# Patient Record
Sex: MALE | Race: BLACK OR AFRICAN AMERICAN | NOT HISPANIC OR LATINO
[De-identification: names, ages, dates, MRNs, and addresses within clinical notes are randomized per-mention and may not be internally consistent; named-entity substitution may affect disease eponyms.]

---
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
## 2021-05-26 NOTE — ED PROVIDER NOTE - NS ED ROS FT
Wheelchair/Stroller General: Denies fever, chills  HEENT: Denies sensory changes, sore throat  Neck: Denies neck pain, neck stiffness  Resp: Endorses SOB  Cardiovascular: Endorses palpitations. Denies CP, LE edema  GI: Denies nausea, vomiting, abdominal pain, diarrhea, constipation, blood in stool  : Denies dysuria, hematuria, frequency, incontinence  MSK: Denies back pain  Neuro: Denies HA, dizziness, numbness, weakness  Skin: Denies rashes

## 2021-08-02 ENCOUNTER — TRANSCRIPTION ENCOUNTER (OUTPATIENT)
Age: 21
End: 2021-08-02

## 2022-06-15 ENCOUNTER — NON-APPOINTMENT (OUTPATIENT)
Age: 22
End: 2022-06-15

## 2023-06-01 ENCOUNTER — EMERGENCY (EMERGENCY)
Facility: HOSPITAL | Age: 23
LOS: 1 days | Discharge: HOME | End: 2023-06-01
Attending: STUDENT IN AN ORGANIZED HEALTH CARE EDUCATION/TRAINING PROGRAM
Payer: MEDICAID

## 2023-06-01 VITALS
HEART RATE: 125 BPM | TEMPERATURE: 98 F | RESPIRATION RATE: 21 BRPM | OXYGEN SATURATION: 99 % | DIASTOLIC BLOOD PRESSURE: 65 MMHG | SYSTOLIC BLOOD PRESSURE: 115 MMHG

## 2023-06-01 VITALS
SYSTOLIC BLOOD PRESSURE: 145 MMHG | DIASTOLIC BLOOD PRESSURE: 61 MMHG | TEMPERATURE: 98 F | WEIGHT: 125.44 LBS | OXYGEN SATURATION: 99 % | HEART RATE: 166 BPM | RESPIRATION RATE: 20 BRPM

## 2023-06-01 PROBLEM — U07.1 COVID-19: Chronic | Status: ACTIVE | Noted: 2020-05-05

## 2023-06-01 LAB
ALBUMIN SERPL ELPH-MCNC: 4.9 G/DL — SIGNIFICANT CHANGE UP (ref 3.3–5.2)
ALP SERPL-CCNC: 91 U/L — SIGNIFICANT CHANGE UP (ref 40–120)
ALT FLD-CCNC: 15 U/L — SIGNIFICANT CHANGE UP
ANION GAP SERPL CALC-SCNC: 17 MMOL/L — SIGNIFICANT CHANGE UP (ref 5–17)
AST SERPL-CCNC: 23 U/L — SIGNIFICANT CHANGE UP
BASOPHILS # BLD AUTO: 0.06 K/UL — SIGNIFICANT CHANGE UP (ref 0–0.2)
BASOPHILS NFR BLD AUTO: 0.9 % — SIGNIFICANT CHANGE UP (ref 0–2)
BILIRUB SERPL-MCNC: 0.5 MG/DL — SIGNIFICANT CHANGE UP (ref 0.4–2)
BUN SERPL-MCNC: 9.3 MG/DL — SIGNIFICANT CHANGE UP (ref 8–20)
CALCIUM SERPL-MCNC: 9.5 MG/DL — SIGNIFICANT CHANGE UP (ref 8.4–10.5)
CHLORIDE SERPL-SCNC: 100 MMOL/L — SIGNIFICANT CHANGE UP (ref 96–108)
CO2 SERPL-SCNC: 22 MMOL/L — SIGNIFICANT CHANGE UP (ref 22–29)
CREAT SERPL-MCNC: 1.12 MG/DL — SIGNIFICANT CHANGE UP (ref 0.5–1.3)
EGFR: 95 ML/MIN/1.73M2 — SIGNIFICANT CHANGE UP
EOSINOPHIL # BLD AUTO: 0.34 K/UL — SIGNIFICANT CHANGE UP (ref 0–0.5)
EOSINOPHIL NFR BLD AUTO: 5.2 % — SIGNIFICANT CHANGE UP (ref 0–6)
FLUAV AG NPH QL: SIGNIFICANT CHANGE UP
FLUBV AG NPH QL: SIGNIFICANT CHANGE UP
GLUCOSE SERPL-MCNC: 141 MG/DL — HIGH (ref 70–99)
HCT VFR BLD CALC: 44.6 % — SIGNIFICANT CHANGE UP (ref 39–50)
HGB BLD-MCNC: 15 G/DL — SIGNIFICANT CHANGE UP (ref 13–17)
IMM GRANULOCYTES NFR BLD AUTO: 0 % — SIGNIFICANT CHANGE UP (ref 0–0.9)
LYMPHOCYTES # BLD AUTO: 4.29 K/UL — HIGH (ref 1–3.3)
LYMPHOCYTES # BLD AUTO: 65.1 % — HIGH (ref 13–44)
MAGNESIUM SERPL-MCNC: 1.9 MG/DL — SIGNIFICANT CHANGE UP (ref 1.6–2.6)
MCHC RBC-ENTMCNC: 30 PG — SIGNIFICANT CHANGE UP (ref 27–34)
MCHC RBC-ENTMCNC: 33.6 GM/DL — SIGNIFICANT CHANGE UP (ref 32–36)
MCV RBC AUTO: 89.2 FL — SIGNIFICANT CHANGE UP (ref 80–100)
MONOCYTES # BLD AUTO: 0.33 K/UL — SIGNIFICANT CHANGE UP (ref 0–0.9)
MONOCYTES NFR BLD AUTO: 5 % — SIGNIFICANT CHANGE UP (ref 2–14)
NEUTROPHILS # BLD AUTO: 1.57 K/UL — LOW (ref 1.8–7.4)
NEUTROPHILS NFR BLD AUTO: 23.8 % — LOW (ref 43–77)
PLATELET # BLD AUTO: 288 K/UL — SIGNIFICANT CHANGE UP (ref 150–400)
POTASSIUM SERPL-MCNC: 3.1 MMOL/L — LOW (ref 3.5–5.3)
POTASSIUM SERPL-SCNC: 3.1 MMOL/L — LOW (ref 3.5–5.3)
PROT SERPL-MCNC: 7.8 G/DL — SIGNIFICANT CHANGE UP (ref 6.6–8.7)
RBC # BLD: 5 M/UL — SIGNIFICANT CHANGE UP (ref 4.2–5.8)
RBC # FLD: 12.1 % — SIGNIFICANT CHANGE UP (ref 10.3–14.5)
RSV RNA NPH QL NAA+NON-PROBE: SIGNIFICANT CHANGE UP
SARS-COV-2 RNA SPEC QL NAA+PROBE: SIGNIFICANT CHANGE UP
SODIUM SERPL-SCNC: 139 MMOL/L — SIGNIFICANT CHANGE UP (ref 135–145)
TROPONIN T SERPL-MCNC: <0.01 NG/ML — SIGNIFICANT CHANGE UP (ref 0–0.06)
TSH SERPL-MCNC: 1.32 UIU/ML — SIGNIFICANT CHANGE UP (ref 0.27–4.2)
WBC # BLD: 6.59 K/UL — SIGNIFICANT CHANGE UP (ref 3.8–10.5)
WBC # FLD AUTO: 6.59 K/UL — SIGNIFICANT CHANGE UP (ref 3.8–10.5)

## 2023-06-01 PROCEDURE — 71045 X-RAY EXAM CHEST 1 VIEW: CPT

## 2023-06-01 PROCEDURE — 87637 SARSCOV2&INF A&B&RSV AMP PRB: CPT

## 2023-06-01 PROCEDURE — 99285 EMERGENCY DEPT VISIT HI MDM: CPT | Mod: 25

## 2023-06-01 PROCEDURE — 71045 X-RAY EXAM CHEST 1 VIEW: CPT | Mod: 26

## 2023-06-01 PROCEDURE — 84484 ASSAY OF TROPONIN QUANT: CPT

## 2023-06-01 PROCEDURE — 93005 ELECTROCARDIOGRAM TRACING: CPT

## 2023-06-01 PROCEDURE — 80053 COMPREHEN METABOLIC PANEL: CPT

## 2023-06-01 PROCEDURE — 85025 COMPLETE CBC W/AUTO DIFF WBC: CPT

## 2023-06-01 PROCEDURE — 36415 COLL VENOUS BLD VENIPUNCTURE: CPT

## 2023-06-01 PROCEDURE — 83735 ASSAY OF MAGNESIUM: CPT

## 2023-06-01 PROCEDURE — 93010 ELECTROCARDIOGRAM REPORT: CPT

## 2023-06-01 PROCEDURE — 99285 EMERGENCY DEPT VISIT HI MDM: CPT

## 2023-06-01 PROCEDURE — 84443 ASSAY THYROID STIM HORMONE: CPT

## 2023-06-01 RX ORDER — POTASSIUM CHLORIDE 20 MEQ
40 PACKET (EA) ORAL ONCE
Refills: 0 | Status: DISCONTINUED | OUTPATIENT
Start: 2023-06-01 | End: 2023-06-08

## 2023-06-01 RX ORDER — SODIUM CHLORIDE 9 MG/ML
1000 INJECTION, SOLUTION INTRAVENOUS ONCE
Refills: 0 | Status: COMPLETED | OUTPATIENT
Start: 2023-06-01 | End: 2023-06-01

## 2023-06-01 RX ADMIN — SODIUM CHLORIDE 1000 MILLILITER(S): 9 INJECTION, SOLUTION INTRAVENOUS at 05:31

## 2023-06-01 RX ADMIN — SODIUM CHLORIDE 1000 MILLILITER(S): 9 INJECTION, SOLUTION INTRAVENOUS at 04:22

## 2023-06-01 NOTE — ED PROVIDER NOTE - ATTENDING CONTRIBUTION TO CARE
24 yo healthy male presents for evaluation after feeling abnormal after smoking marijuana tonight; described as feeling out of sorts. Called to evaluate the patient promptly. Patient found to have a normal examination except for tachycardia and mild resting tremors.   I personally saw the patient with the resident, and completed the key components of the history and physical exam. I then discussed the management plan with the resident.

## 2023-06-01 NOTE — ED PROVIDER NOTE - CLINICAL SUMMARY MEDICAL DECISION MAKING FREE TEXT BOX
22 yo male presenting with acute tachycardia secondary to marijuana( possible co-ingestant) use this evening. Patient with improvement. Labs are normal appearing.

## 2023-06-01 NOTE — ED PROVIDER NOTE - NS ED ROS FT
Review of Systems  CONSTITUTIONAL: afebrile w/no diaphoresis or weight changes  SKIN: warm, dry w/ no rash or bleeding  EYES: no changes to vision  ENT: no changes in hearing, no sore throat  RESPIRATORY: no cough or SOB  CARDIAC: +CHEST PRESSURE  GI: no abd pain, nausea, vomiting, diarrhea, constipation, or blood in stool/asia blood  GENITO-URINARY: no discharge, dysuria or hematuria,   MUSCULOSKELETAL:  no joint pain, swelling or redness  NEUROLOGIC: no weakness, headache, anesthesia or paresthesias  PSYCH: no anxiety, non suicidal, non homicidal, without hallucinations or depression

## 2023-06-01 NOTE — ED PROVIDER NOTE - PHYSICAL EXAMINATION
VITAL SIGNS: I have reviewed vital signs  CONSTITUTIONAL:  in no acute distress.  SKIN: Warm, dry, no rash.  HEAD: Normocephalic, atraumatic.  EYES: PERRL, conjunctiva and sclera clear.  ENT: pink & moist mucosa, no pharyngeal erythema  NECK: Supple, non tender. No cervical lymphadenopathy.  CARD: +TACHYCARDIA  RESP: No wheezes, rales or rhonchi.   ABD:  soft, non-distended, non-tender.   MSK:  Good ROM in upper/lower extremities w/o pain.   NEURO: Alert, oriented. Grossly unremarkable. No focal deficits.   PSYCH: Cooperative, alert & oriented x3

## 2023-06-01 NOTE — ED PROVIDER NOTE - PATIENT PORTAL LINK FT
You can access the FollowMyHealth Patient Portal offered by [facility] by registering at the following website: [email or web address]. By joining [facility]’s FollowMyHealth portal, you will also be able to view your health information using other applications (apps) compatible with our system.

## 2023-06-01 NOTE — ED ADULT NURSE NOTE - NSFALLUNIVINTERV_ED_ALL_ED
Bed/Stretcher in lowest position, wheels locked, appropriate side rails in place/Call bell, personal items and telephone in reach/Instruct patient to call for assistance before getting out of bed/chair/stretcher/Non-slip footwear applied when patient is off stretcher/[place] to call system/Physically safe environment - no spills, clutter or unnecessary equipment/Purposeful proactive rounding/Room/bathroom lighting operational, light cord in reach

## 2023-06-01 NOTE — ED ADULT NURSE NOTE - OBJECTIVE STATEMENT
Assumed care of pt A&Ox4, resp even/unlabored, ambulatory, steady gait noted. Pt presenting to ED c/o palpitations. Pt reports palpitations after smoking weed, states same dealer. Pt endorses slight lightheadedness and SOB. Pt brought into critical care immediately, placed on continuos cardiac monitoring and , sinus tach on monitor, rate 149, SpO2 100% on room air, no acute distress noted. Dr. Ayala and resident Dr. Macias at bed side. Pt denies any significant PMHx, known sick contacts, pain/discomfort, abd pain, n/v/d, numbness, tingling, or any other complaints

## 2023-06-01 NOTE — ED PROVIDER NOTE - RATE
Called and talked to patient she will stop the Eliquis for 7 days as requested
OK to hold given taking for Afib. If needs pre-op, then needs to be seen. Please let me know if you have any questions.   [id number] [address], DO 6/23/2019 11:29 PM
Patient called is having kidney biopsy 07/03 and was told to stop taking ELIQUIS 5 MG 7 days prior, wants to know if okay to do?
159

## 2023-06-01 NOTE — ED PROVIDER NOTE - NSFOLLOWUPINSTRUCTIONS_ED_ALL_ED_FT
1) Follow up with your doctor in 1-2 days  2) Return to the ER for worsening or concerning symptoms  Sinus Tachycardia       Sinus tachycardia is a kind of fast heartbeat. In sinus tachycardia, the heart beats more than 100 times a minute. Sinus tachycardia starts in a part of the heart called the sinus node. Sinus tachycardia may be harmless, or it may be a sign of a serious condition.    What are the causes?  This condition may be caused by:    Exercise or exertion.  A fever.  Pain.  Loss of body fluids (dehydration).  Severe bleeding (hemorrhage).  Anxiety and stress.  Certain substances, including:    Alcohol.  Caffeine.  Tobacco and nicotine products.  Cold medicines.  Illegal drugs.  Medical conditions including:    Heart disease.  An infection.  An overactive thyroid (hyperthyroidism).  A lack of red blood cells (anemia).    What are the signs or symptoms?  Symptoms of this condition include:    A feeling that the heart is beating quickly (palpitations).  Suddenly noticing your heartbeat (cardiac awareness).  Dizziness.  Tiredness (fatigue).  Shortness of breath.  Chest pain.  Nausea.  Fainting.    How is this diagnosed?  This condition is diagnosed with:    A physical exam.  Other tests, such as:    Blood tests.  An electrocardiogram (ECG). This test measures the electrical activity of the heart.  Ambulatory cardiac monitor. This records your heartbeats for 24 hours or more.    You may be referred to a heart specialist (cardiologist).    How is this treated?  Treatment for this condition depends on the cause or the underlying condition. Treatment may involve:    Treating the underlying condition.  Taking new medicines or changing your current medicines as told by your health care provider.  Making changes to your diet or lifestyle.    Follow these instructions at home:      Lifestyle     Do not use any products that contain nicotine or tobacco, such as cigarettes and e-cigarettes. If you need help quitting, ask your health care provider.  Do not use illegal drugs, such as cocaine.  Learn relaxation methods to help you when you get stressed or anxious. These include deep breathing.  Avoid caffeine or other stimulants.        Alcohol use     Do not drink alcohol if:    Your health care provider tells you not to drink.  You are pregnant, may be pregnant, or are planning to become pregnant.  If you drink alcohol, limit how much you have:    0–1 drink a day for women.  0–2 drinks a day for men.  Be aware of how much alcohol is in your drink. In the [place] one drink equals one typical bottle of beer (12 oz), one-half glass of wine (5 oz), or one shot of hard liquor (1½ oz).        General instructions    Drink enough fluids to keep your urine pale yellow.  Take over-the-counter and prescription medicines only as told by your health care provider.  Keep all follow-up visits as told by your health care provider. This is important.    Contact a health care provider if you have:  A fever.  Vomiting or diarrhea that does not go away.    Get help right away if you:  Have pain in your chest, upper arms, jaw, or neck.  Become weak or dizzy.  Feel faint.  Have palpitations that do not go away.    Summary  In sinus tachycardia, the heart beats more than 100 times a minute.  Sinus tachycardia may be harmless, or it may be a sign of a serious condition.  Treatment for this condition depends on the cause or the underlying condition.  Get help right away if you have pain in your chest, upper arms, jaw, or neck.  Preventing Marijuana Misuse    Marijuana is a mixture of the dried leaves and flowers of the hemp plant Cannabis sativa. The plant's active ingredients (cannabinoids) change the chemistry of the brain. If you smoke or eat marijuana, you will experience changes in the way you think, feel, and behave.    What is marijuana used for?  In some cases, marijuana is prescribed by a health care provider (medical marijuana) for temporary relief from a medical condition. It can have medical effects, such as:    Reduced nausea.   Increased appetite.   Reduced muscle spasm.   Pain relief.  Anxiety relief.    Many people use marijuana for recreational purposes because it helps them relax and puts them in a pleasurable mood (marijuana high).    How can marijuana use affect me?  Marijuana affects you both mentally and physically. Using marijuana can make you feel high and relaxed. It can also have negative effects, both short term and long term. When used for recreational purposes, marijuana is often used in higher doses and for longer periods. High doses of marijuana can cause unpleasant side effects. It is also possible to become addicted to this drug.    Short-term effects of marijuana use include:    Changes in mood and perception, such as an altered sense of time.  Increased heart rate.  Increased appetite.  Slowed movement, coordination, and reaction time.  Poor memory, judgment, and problem-solving ability.  Drowsiness.  Bloodshot eyes and changes in vision.  Coughing.    High doses of marijuana can cause:    Panic.   Anxiety.   Mental confusion.  Severe dizziness.  Vomiting.  Hallucinations. This means you see, hear, taste, smell, or feel things that are not real.  Coma.    What can happen if I keep using marijuana?  If you use marijuana for a long time, it can have long-term effects such as:    Higher risk of health problems, such as:     Lung and breathing problems.   Possible higher risk of heart and cardiovascular problems or testicular cancer.  Mental and physical dependence (addiction).  Slowed brain development in young people. Babies whose mothers used marijuana during pregnancy may have an increased risk of problems with brain development and behavior.  Hallucinations or temporary periods of false perceptions or beliefs (paranoia).  Worsening of mental illness or onset of new mental illness. This can include anxiety, depression, or suicidal thoughts.  Difficulty maintaining healthy relationships.   Poor memory and difficulty concentrating and learning. This can result in decreased intelligence, poor performance at school or work, and an increased risk of dropping out of school.  Higher risk of using other substances like alcohol, nicotine, and illegal drugs.  A condition called cannabinoid hyperemesis syndrome. This causes repeated episodes of intense abdominal pain, nausea, and vomiting.    Quitting marijuana after using it for a long time can cause withdrawal symptoms, such as:    Headache.   Shakiness.   Cravings for the drug.   Sweating.   Stomach pain, nausea, and vomiting.  Restlessness and trouble sleeping.   Anxiety, irritability, and anger.   Decreased appetite.    What are the benefits of not using marijuana?  Not using marijuana can keep you from becoming dependent on it. You can avoid the drug's negative effects on your quality of life. You can avoid accidents caused by the slowed reaction time and decreased thinking ability that are common with marijuana use and abuse. You can also prevent the long-term effects that this drug can cause.    What actions can I take to stop using marijuana?     If you are not physically or mentally dependent on marijuana, you should be able to stop using it on your own. Taking these actions may help:    Find healthy ways to cope with stress, such as exercise, meditation, or spending time with family and friends. Talk with your health care provider about how you feel and how to cope with stress.   Spend time with people who do not use marijuana, or make new friends who do not use marijuana.   Do something else instead of using marijuana. You can exercise, take up a hobby, or participate in activities that you can do with others.   Do not be afraid to say no if someone offers you marijuana. Speak up about why you do not want to use drugs. You can be a positive role model for others.    If you cannot stop on your own, ask your health care provider for help. Treatment for marijuana addiction is similar to treatment for other addictions. It may include:    Cognitive-behavioral therapy (psychotherapy). This may include individual or group therapy.  Joining a support group.   Treating medical, behavioral, or mental health conditions that exist along with marijuana dependency.    Where to find more information  Learn more about:    Marijuana from the [place] on Drug [place]: www.drugabuse.gov  Medical marijuana from the National Institutes of Health: nccih.nih.gov  Treatment options from the Substance Abuse and Mental Health Services Administration: [facility]  Recovery from marijuana dependency from Recovery.org: www.recovery.org    Contact a health care provider if:  You want to stop using marijuana but you cannot.  You have withdrawal symptoms when you try to stop using marijuana.  You are using marijuana every day.  You need to use increasing amounts of marijuana to get the same desired effect.  You are using marijuana along with other drugs like cocaine or alcohol.  You have anxiety or depression.  You have hallucinations or paranoia.  Marijuana use is interfering with your relationships or your ability to function normally at school or at work.    Get help right away if:  You develop severe chest pain, dizziness, or shortness of breath.  You have severe abdominal pain, nausea, and vomiting.    Summary  Using marijuana can make you feel high and relaxed, and if used properly, it can have some medical benefits. However, it can also have negative effects, both short term and long term.  High doses of marijuana can cause unpleasant side effects. These can be both physical and mental.  Marijuana has the potential to cause physical dependence and even addiction.  Long-term use may interfere with your ability to function normally at home, school, or work. It can sometimes lead to using other substances like alcohol, nicotine, and illegal drugs.  If you need help to stop using marijuana, ask your health care provider. Marijuana addiction can be treated.

## 2023-06-01 NOTE — ED PROVIDER NOTE - OBJECTIVE STATEMENT
ZACK MARIO is a 22yo Male with no PMH who presents c/o elevated HR. States he was smoking marijuana tonight when he started feeling uneasy and noticed his heart was going very fast. Denies any other drug use. Denies alcohol. Endorses cough for about 1.5 weeks. PT denies any symptoms of cp/sob, fevers, chills, nausea, vomiting, abdominal pain, urinary symptoms, weakness, confusion.

## 2023-06-01 NOTE — ED PROVIDER NOTE - PROGRESS NOTE DETAILS
Attempted Vagal maneuvers and heart decreased from 160's to 130's. Patient currently resting comfortably. heart remains in the 120's. Will continue to observe. Lydia: PT . Father bedside and amenable to discharge. Return precautions provided.

## 2023-06-14 ENCOUNTER — NON-APPOINTMENT (OUTPATIENT)
Age: 23
End: 2023-06-14

## 2023-09-13 ENCOUNTER — NON-APPOINTMENT (OUTPATIENT)
Age: 23
End: 2023-09-13

## 2023-09-24 ENCOUNTER — NON-APPOINTMENT (OUTPATIENT)
Age: 23
End: 2023-09-24

## 2023-10-03 PROBLEM — Z00.00 ENCOUNTER FOR PREVENTIVE HEALTH EXAMINATION: Status: ACTIVE | Noted: 2023-10-03

## 2023-10-12 ENCOUNTER — NON-APPOINTMENT (OUTPATIENT)
Age: 23
End: 2023-10-12

## 2023-10-12 ENCOUNTER — APPOINTMENT (OUTPATIENT)
Dept: CARDIOLOGY | Facility: CLINIC | Age: 23
End: 2023-10-12
Payer: MEDICAID

## 2023-10-12 VITALS — OXYGEN SATURATION: 100 % | SYSTOLIC BLOOD PRESSURE: 98 MMHG | DIASTOLIC BLOOD PRESSURE: 62 MMHG | HEART RATE: 96 BPM

## 2023-10-12 VITALS — WEIGHT: 122 LBS

## 2023-10-12 DIAGNOSIS — F41.9 ANXIETY DISORDER, UNSPECIFIED: ICD-10-CM

## 2023-10-12 DIAGNOSIS — R07.89 OTHER CHEST PAIN: ICD-10-CM

## 2023-10-12 DIAGNOSIS — R00.2 PALPITATIONS: ICD-10-CM

## 2023-10-12 PROCEDURE — 93000 ELECTROCARDIOGRAM COMPLETE: CPT

## 2023-10-12 PROCEDURE — 99204 OFFICE O/P NEW MOD 45 MIN: CPT | Mod: 25

## 2023-10-12 RX ORDER — BUSPIRONE HYDROCHLORIDE 10 MG/1
10 TABLET ORAL TWICE DAILY
Refills: 0 | Status: ACTIVE | COMMUNITY

## 2023-10-26 ENCOUNTER — APPOINTMENT (OUTPATIENT)
Dept: CARDIOLOGY | Facility: CLINIC | Age: 23
End: 2023-10-26

## 2023-10-30 ENCOUNTER — APPOINTMENT (OUTPATIENT)
Dept: CARDIOLOGY | Facility: CLINIC | Age: 23
End: 2023-10-30
Payer: MEDICAID

## 2023-10-30 PROCEDURE — 93306 TTE W/DOPPLER COMPLETE: CPT

## 2023-11-10 ENCOUNTER — APPOINTMENT (OUTPATIENT)
Dept: CARDIOLOGY | Facility: CLINIC | Age: 23
End: 2023-11-10

## 2023-12-02 ENCOUNTER — NON-APPOINTMENT (OUTPATIENT)
Age: 23
End: 2023-12-02

## 2024-05-11 ENCOUNTER — NON-APPOINTMENT (OUTPATIENT)
Age: 24
End: 2024-05-11

## 2024-09-20 NOTE — ED ADULT NURSE NOTE - NSFALLRSKASSESASSIST_ED_ALL_ED
[CV Risk Factors and Non-Cardiac Disease] : CV risk factors and non-cardiac disease [Hyperlipidemia] : hyperlipidemia [Hypertension] : hypertension [Coronary Artery Disease] : coronary artery disease no

## 2025-05-21 ENCOUNTER — NON-APPOINTMENT (OUTPATIENT)
Age: 25
End: 2025-05-21

## 2025-09-17 ENCOUNTER — NON-APPOINTMENT (OUTPATIENT)
Age: 25
End: 2025-09-17